# Patient Record
Sex: FEMALE | Race: WHITE | NOT HISPANIC OR LATINO | Employment: UNEMPLOYED | URBAN - METROPOLITAN AREA
[De-identification: names, ages, dates, MRNs, and addresses within clinical notes are randomized per-mention and may not be internally consistent; named-entity substitution may affect disease eponyms.]

---

## 2021-05-07 ENCOUNTER — HISTORICAL (OUTPATIENT)
Dept: RADIOLOGY | Facility: HOSPITAL | Age: 21
End: 2021-05-07

## 2021-06-22 ENCOUNTER — HISTORICAL (OUTPATIENT)
Dept: LAB | Facility: HOSPITAL | Age: 21
End: 2021-06-22

## 2021-06-22 LAB
ABS NEUT (OLG): 4.6 X10(3)/MCL (ref 1.5–6.9)
ALBUMIN SERPL-MCNC: 4.1 GM/DL (ref 3.5–5)
ALBUMIN/GLOB SERPL: 1.3 RATIO (ref 1.1–2)
ALP SERPL-CCNC: 68 UNIT/L (ref 40–150)
ALT SERPL-CCNC: 12 UNIT/L (ref 0–55)
AST SERPL-CCNC: 15 UNIT/L (ref 5–34)
BASOPHILS # BLD AUTO: 0 X10(3)/MCL (ref 0–0.1)
BASOPHILS NFR BLD AUTO: 0 % (ref 0–1)
BILIRUB SERPL-MCNC: 0.5 MG/DL
BILIRUBIN DIRECT+TOT PNL SERPL-MCNC: 0.2 MG/DL (ref 0–0.5)
BILIRUBIN DIRECT+TOT PNL SERPL-MCNC: 0.3 MG/DL (ref 0–0.8)
BUN SERPL-MCNC: 12 MG/DL (ref 7–18.7)
CALCIUM SERPL-MCNC: 9.5 MG/DL (ref 8.4–10.2)
CHLORIDE SERPL-SCNC: 108 MMOL/L (ref 98–107)
CO2 SERPL-SCNC: 26 MMOL/L (ref 22–29)
CREAT SERPL-MCNC: 0.8 MG/DL (ref 0.55–1.02)
CRP SERPL-MCNC: 0.19 MG/DL
EOSINOPHIL # BLD AUTO: 0.2 X10(3)/MCL (ref 0–0.6)
EOSINOPHIL NFR BLD AUTO: 2 % (ref 0–5)
ERYTHROCYTE [DISTWIDTH] IN BLOOD BY AUTOMATED COUNT: 13.7 % (ref 11.5–17)
ERYTHROCYTE [SEDIMENTATION RATE] IN BLOOD: 11 MM/HR (ref 0–20)
GLOBULIN SER-MCNC: 3.1 GM/DL (ref 2.4–3.5)
GLUCOSE SERPL-MCNC: 95 MG/DL (ref 74–100)
HCT VFR BLD AUTO: 37.8 % (ref 36–48)
HGB BLD-MCNC: 12.7 GM/DL (ref 12–16)
IMM GRANULOCYTES # BLD AUTO: 0.01 10*3/UL (ref 0–0.02)
IMM GRANULOCYTES NFR BLD AUTO: 0.2 % (ref 0–0.43)
LYMPHOCYTES # BLD AUTO: 1.3 X10(3)/MCL (ref 0.5–4.1)
LYMPHOCYTES NFR BLD AUTO: 20 % (ref 15–40)
MCH RBC QN AUTO: 30 PG (ref 27–34)
MCHC RBC AUTO-ENTMCNC: 34 GM/DL (ref 31–36)
MCV RBC AUTO: 88 FL (ref 80–99)
MONOCYTES # BLD AUTO: 0.6 X10(3)/MCL (ref 0–1.1)
MONOCYTES NFR BLD AUTO: 8 % (ref 4–12)
NEUTROPHILS # BLD AUTO: 4.6 X10(3)/MCL (ref 1.5–6.9)
NEUTROPHILS NFR BLD AUTO: 69 % (ref 43–75)
PLATELET # BLD AUTO: 187 X10(3)/MCL (ref 140–400)
PMV BLD AUTO: 12.8 FL (ref 6.8–10)
POTASSIUM SERPL-SCNC: 4.2 MMOL/L (ref 3.5–5.1)
PROT SERPL-MCNC: 7.2 GM/DL (ref 6.4–8.3)
RBC # BLD AUTO: 4.28 X10(6)/MCL (ref 4.2–5.4)
SODIUM SERPL-SCNC: 139 MMOL/L (ref 136–145)
WBC # SPEC AUTO: 6.6 X10(3)/MCL (ref 4.5–11.5)

## 2021-09-07 ENCOUNTER — HISTORICAL (OUTPATIENT)
Dept: LAB | Facility: HOSPITAL | Age: 21
End: 2021-09-07

## 2021-09-07 LAB — DEPRECATED CALCIDIOL+CALCIFEROL SERPL-MC: 96.7 NG/ML (ref 30–80)

## 2021-09-13 ENCOUNTER — HISTORICAL (OUTPATIENT)
Dept: RADIOLOGY | Facility: HOSPITAL | Age: 21
End: 2021-09-13

## 2021-11-08 ENCOUNTER — HISTORICAL (OUTPATIENT)
Dept: LAB | Facility: HOSPITAL | Age: 21
End: 2021-11-08

## 2021-11-08 LAB
ANTINUCLEAR ANTIBODY SCREEN (OHS): NEGATIVE
CENTROMERE PROTEIN ANTIBODY (OHS): NEGATIVE
DSDNA ANTIBODY (OHS): NEGATIVE
HCV AB SERPL QL IA: NONREACTIVE
JO-1 ANTIBODY (OHS): NEGATIVE
RHEUMATOID FACT SERPL-ACNC: <13 IU/ML
RNP70 ANTIBODY (OHS): NEGATIVE
SCLERODERMA (SCL-70S) ANTIBODY (OHS): NEGATIVE
SMITH DP IGG (OHS): NEGATIVE
SSA(RO) ANTIBODY (OHS): NEGATIVE
SSB(LA) ANTIBODY (OHS): NEGATIVE
U1RNP ANTIBODY (OHS): NEGATIVE

## 2022-12-20 ENCOUNTER — HOSPITAL ENCOUNTER (OUTPATIENT)
Dept: RADIOLOGY | Facility: HOSPITAL | Age: 22
Discharge: HOME | End: 2022-12-20
Payer: MEDICAID

## 2022-12-20 DIAGNOSIS — M79.604 RIGHT LEG PAIN: ICD-10-CM

## 2022-12-20 PROCEDURE — 73590 X-RAY EXAM OF LOWER LEG: CPT | Mod: TC,RT

## 2023-06-16 ENCOUNTER — OFFICE VISIT (OUTPATIENT)
Dept: OBSTETRICS AND GYNECOLOGY | Facility: CLINIC | Age: 23
End: 2023-06-16
Payer: MEDICAID

## 2023-06-16 VITALS
DIASTOLIC BLOOD PRESSURE: 74 MMHG | SYSTOLIC BLOOD PRESSURE: 120 MMHG | HEIGHT: 65 IN | WEIGHT: 150 LBS | BODY MASS INDEX: 24.99 KG/M2

## 2023-06-16 DIAGNOSIS — Z12.4 CERVICAL CANCER SCREENING: ICD-10-CM

## 2023-06-16 DIAGNOSIS — Z11.3 SCREENING FOR STDS (SEXUALLY TRANSMITTED DISEASES): ICD-10-CM

## 2023-06-16 DIAGNOSIS — Z30.9 ENCOUNTER FOR CONTRACEPTIVE MANAGEMENT, UNSPECIFIED TYPE: Primary | ICD-10-CM

## 2023-06-16 DIAGNOSIS — B00.1 FEVER BLISTER: ICD-10-CM

## 2023-06-16 DIAGNOSIS — Z30.018 ENCOUNTER FOR INITIAL PRESCRIPTION OF OTHER CONTRACEPTIVES: ICD-10-CM

## 2023-06-16 LAB
B-HCG UR QL: NEGATIVE
CTP QC/QA: YES

## 2023-06-16 PROCEDURE — 1160F RVW MEDS BY RX/DR IN RCRD: CPT | Mod: CPTII,,,

## 2023-06-16 PROCEDURE — 1160F PR REVIEW ALL MEDS BY PRESCRIBER/CLIN PHARMACIST DOCUMENTED: ICD-10-PCS | Mod: CPTII,,,

## 2023-06-16 PROCEDURE — 1159F MED LIST DOCD IN RCRD: CPT | Mod: CPTII,,,

## 2023-06-16 PROCEDURE — 3008F PR BODY MASS INDEX (BMI) DOCUMENTED: ICD-10-PCS | Mod: CPTII,,,

## 2023-06-16 PROCEDURE — 81025 POCT URINE PREGNANCY: ICD-10-PCS | Mod: ,,,

## 2023-06-16 PROCEDURE — 99395 PREV VISIT EST AGE 18-39: CPT | Mod: ,,,

## 2023-06-16 PROCEDURE — 3078F PR MOST RECENT DIASTOLIC BLOOD PRESSURE < 80 MM HG: ICD-10-PCS | Mod: CPTII,,,

## 2023-06-16 PROCEDURE — 99395 PR PREVENTIVE VISIT,EST,18-39: ICD-10-PCS | Mod: ,,,

## 2023-06-16 PROCEDURE — 1159F PR MEDICATION LIST DOCUMENTED IN MEDICAL RECORD: ICD-10-PCS | Mod: CPTII,,,

## 2023-06-16 PROCEDURE — 3008F BODY MASS INDEX DOCD: CPT | Mod: CPTII,,,

## 2023-06-16 PROCEDURE — 3074F PR MOST RECENT SYSTOLIC BLOOD PRESSURE < 130 MM HG: ICD-10-PCS | Mod: CPTII,,,

## 2023-06-16 PROCEDURE — 81025 URINE PREGNANCY TEST: CPT | Mod: ,,,

## 2023-06-16 PROCEDURE — 3074F SYST BP LT 130 MM HG: CPT | Mod: CPTII,,,

## 2023-06-16 PROCEDURE — 3078F DIAST BP <80 MM HG: CPT | Mod: CPTII,,,

## 2023-06-16 RX ORDER — VALACYCLOVIR HYDROCHLORIDE 1 G/1
1000 TABLET, FILM COATED ORAL 2 TIMES DAILY
Qty: 20 TABLET | Refills: 0 | Status: SHIPPED | OUTPATIENT
Start: 2023-06-16 | End: 2023-11-30 | Stop reason: ALTCHOICE

## 2023-06-19 NOTE — PROGRESS NOTES
Chief Complaint:  Well Woman (C/o yeast infection./Desires STD cultures. Not sexually active at this time. Desires to discuss non hormonal contraception. Cycles 28-35 patricio days. )    History of Present Illness:  Emily Avilez is a 23 y.o. year old  presents for her well woman. Currently using abstinence for birth control but would like to discuss non hormonal forms of contraception. Patient has monthly cycles with normal flow lasting 5 days. Denies any irregular menstrual bleeding. Pt desires STD testing d/t an increase in discharge. Pt also desires Valtrex for active fever blister.    LMP: 2023  Frequency: 28-35   Cycle Length: 5 days   Flow: normal  Intermenstrual Bleeding: No  Postcoital Bleeding: No  Dysmenorrhea: No  Sexually Active: not currently    Dyspareunia: No  Contraception: none    H/o STI: No   Last pap: over 1 year ago per pt  H/o Abnormal Pap: No   Colposcopy: No  Gardasil: 0   MMG: n/a  H/o abnl MMG: n/a         Review of Systems:  General/Constitutional: Chills denies. Fatigue/weakness denies. Fever denies. Night sweats denies. Hot flashes denies , +fever blister to right side of lower lip.  Respiratory: Cough denies. Hemoptysis denies. SOB denies. Sputum production denies. Wheezing denies .   Cardiovascular: Chest pain denies . Dizziness denies. Palpitations denies. Swelling in hands/feet denies    Gastrointestinal: Abdominal pain denies. Blood in stool denies. Constipation denies. Diarrhea denies. Heartburn denies. Nausea denies. Vomiting denies    Genitourinary: Incontinence denies. Blood in urine denies. Frequent urination denies. Painful urination denies. Urinary urgency denies. Nocturia denies    Gynecologic: Irregular menses denies. Heavy bleeding denies. Painful menses denies. Vaginal discharge denies. Vaginal odor denies. Vaginal itching denies. Vaginal lesion denies. Pelvic pain denies. Decreased libido denies. Vulvar lesion denies. Prolapse of genital organs denies. Painful  "intercourse denies. Postcoital bleeding denies    Psychiatric: Depression denies. Anxiety denies     OB History    Para Term  AB Living   1 1 1 0 0 1   SAB IAB Ectopic Multiple Live Births   0 0 0 0 1      # 1 - Date: , Sex: Female, Weight: 3.629 kg (8 lb), GA: None, Delivery: Vaginal, Spontaneous, Apgar1: None, Apgar5: None, Living: Living, Birth Comments: None      Past Medical History:   Diagnosis Date    Other lichen planus          Current Outpatient Medications:     valACYclovir (VALTREX) 1000 MG tablet, Take 1 tablet (1,000 mg total) by mouth 2 (two) times daily. for 10 days, Disp: 20 tablet, Rfl: 0    Review of patient's allergies indicates:  No Known Allergies    Past Surgical History:   Procedure Laterality Date    KNEE ARTHROSCOPY W/ ACL RECONSTRUCTION Right 2019    TONSILLECTOMY Bilateral        History reviewed. No pertinent family history.    Social History     Socioeconomic History    Marital status: Single   Tobacco Use    Smoking status: Never    Smokeless tobacco: Never   Substance and Sexual Activity    Alcohol use: Yes    Drug use: Never    Sexual activity: Yes     Partners: Male     Birth control/protection: None       Physical Exam:  /74 (BP Location: Right arm, Patient Position: Sitting)   Ht 5' 5" (1.651 m)   Wt 68 kg (150 lb)   LMP 2023   BMI 24.96 kg/m²     Chaperone: present.     General appearance: healthy, well-nourished and well-developed     Psychiatric: Orientation to time, place and person. Normal mood and affect and active, alert     Skin: Appearance: no rashes or lesions. +fever blister to right side of lower lip    Neck:   Neck: supple, FROM, trachea midline. and no masses   Thyroid: no enlargement or nodules and non-tender.       Cardiovascular:   Auscultation: RRR and no murmur.   Peripheral Vascular: no varicosities, LLE edema, RLE edema, calf tenderness, and palpable cord and pedal pulses intact.     Lungs:   Respiratory effort: no " intercostal retractions or accessory muscle usage.   Auscultation: no wheezing, rales/crackles, or rhonchi and clear to auscultation.     Breast:   Inspection/Palpation: no tenderness, discrete/distinct masses, skin changes, or abnormal secretions. Nipple appearance normal.     Abdomen:   Auscultation/Inspection/Palpation: no hepatomegaly, splenomegaly, masses, tenderness or CVA tenderness and soft, non-distended bowel sounds preset.    Hernia: no palpable hernias.     Female Genitalia:    Vulva: no masses, tenderness or lesions    Bladder/Urethra: no urethral discharge or mass, normal meatus, bladder non-distended.    Vagina: no tenderness, erythema, cystocele, rectocele, abnormal vaginal discharge or vesicle(s) or ulcers    Cervix: no discharge, no cervical lacerations noted or motion tenderness and grossly normal    Uterus: normal size and shape and midline, non-tender, and no uterine prolapse.    Adnexa/Parametria: no parametrial tenderness or mass, no adnexal tenderness or ovarian mass.     Lymph Nodes:   Palpation: non tender submandibular nodes, axillary nodes, or inguinal nodes.     Rectal Exam:   Rectum: normal perianal skin.       Assessment/Plan:  1. Encounter for contraceptive management, unspecified type  Pap  Advised patient if she notices any changes to her breasts, including a lump, mass, dimpling, discharge, rash or tenderness, to should contact us immediately   Healthy diet, exercise   Multivitamin   Seat belt   Sunscreen use   Safe sex/ STI education   Contraception evaluation: Desires Paraguard   Gardasil evaluation: 0/3      2. Cervical cancer screening  -     Liquid-Based Pap Smear, Screening Screening    3. Screening for STDs (sexually transmitted diseases)  -     Leuk panel on pap    4. Fever blister  -     valACYclovir (VALTREX) 1000 MG tablet; Take 1 tablet (1,000 mg total) by mouth 2 (two) times daily. for 10 days  Dispense: 20 tablet; Refill: 0    5. Encounter for initial prescription of  other contraceptives  Discussed with patient contraceptive options including natural family planning, barrier, oral contraceptives, patch, NuvaRing, Depo-Provera, Nexplanon, IUDs, abstinence.       Safe sex education       Discussed with patient that birth control options discussed above do not protect against STDs.     Patient desires the Paraguard IUD as a non hormonal option    Pt will RTC for insertion of Paraguard with Dr. Riggs or Mariama Amaya, MARNI

## 2023-06-20 LAB — PSYCHE PATHOLOGY RESULT: NORMAL

## 2023-06-21 ENCOUNTER — TELEPHONE (OUTPATIENT)
Dept: OBSTETRICS AND GYNECOLOGY | Facility: CLINIC | Age: 23
End: 2023-06-21
Payer: MEDICAID

## 2023-06-21 NOTE — TELEPHONE ENCOUNTER
----- Message from Ramandeep Flores sent at 6/21/2023  3:22 PM CDT -----  Regarding: Nurse call  .Type:  Test Results    Who Called:  patient  Best Call Back Number:  [phone]  Additional Information:   called regarding seeing results on kiana- all shows negative except she thinks she saw something about a yeast infection; is already on meds for something else- should she keep taking the antibiotics or wait until her results are explained and different meds are called out

## 2023-07-03 ENCOUNTER — DOCUMENT SCAN (OUTPATIENT)
Dept: OBSTETRICS AND GYNECOLOGY | Facility: CLINIC | Age: 23
End: 2023-07-03
Payer: MEDICAID

## 2023-07-19 ENCOUNTER — PROCEDURE VISIT (OUTPATIENT)
Dept: OBSTETRICS AND GYNECOLOGY | Facility: CLINIC | Age: 23
End: 2023-07-19
Payer: MEDICAID

## 2023-07-19 VITALS
SYSTOLIC BLOOD PRESSURE: 108 MMHG | BODY MASS INDEX: 24.79 KG/M2 | DIASTOLIC BLOOD PRESSURE: 68 MMHG | WEIGHT: 149 LBS | HEART RATE: 72 BPM

## 2023-07-19 DIAGNOSIS — Z30.430 ENCOUNTER FOR IUD INSERTION: Primary | ICD-10-CM

## 2023-07-19 LAB
B-HCG UR QL: NEGATIVE
CTP QC/QA: YES

## 2023-07-19 PROCEDURE — 58300 INSERT INTRAUTERINE DEVICE: CPT | Mod: ,,, | Performed by: OBSTETRICS & GYNECOLOGY

## 2023-07-19 PROCEDURE — 81025 POCT URINE PREGNANCY: ICD-10-PCS | Mod: ,,, | Performed by: OBSTETRICS & GYNECOLOGY

## 2023-07-19 PROCEDURE — 99499 UNLISTED E&M SERVICE: CPT | Mod: ,,, | Performed by: OBSTETRICS & GYNECOLOGY

## 2023-07-19 PROCEDURE — 99499 NO LOS: ICD-10-PCS | Mod: ,,, | Performed by: OBSTETRICS & GYNECOLOGY

## 2023-07-19 PROCEDURE — 81025 URINE PREGNANCY TEST: CPT | Mod: ,,, | Performed by: OBSTETRICS & GYNECOLOGY

## 2023-07-19 PROCEDURE — 58300 PR INSERT INTRAUTERINE DEVICE: ICD-10-PCS | Mod: ,,, | Performed by: OBSTETRICS & GYNECOLOGY

## 2023-07-19 NOTE — PROGRESS NOTES
Chief Complaint:  IUD insert ( Paraguard insert)    History of Present Illness:   Emily Avilez is a 23 y.o.  who presents today for Paragard IUD placement. Consent given.       LMP: 23  Frequency: 28-35   Cycle Length: 5 days   Flow: normal  Intermenstrual Bleeding: No  Postcoital Bleeding: No  Dysmenorrhea: No  Sexually Active: not currently    Dyspareunia: No  Contraception: none  H/o STI: No   Last pap: over 1 year ago per pt  H/o Abnormal Pap: No   Colposcopy: No  Gardasil: 0   MMG: n/a  H/o abnl MMG: n/a      Review of Systems:  General/Constitutional: Chills denies. Fatigue/weakness denies. Fever denies . Night sweats denies . Hot flashes denies  Gynecologic: Irregular menses denies.  Heavy bleeding  denies.  Painful menses denies.  Vaginal discharge denies. Vaginal odor denies. Vaginal itching/Irritation denies. Vaginal lesion denies.  Pelvic pain denies. Decreased libido denies. Vulvar lesion denies. Prolapse of genital organs denies. Painful intercourse denies. Postcoital bleeding denies   Psychiatric: Mood lability denies.  Depressed mood denies. Suicidal thoughts denies. Anxiety denies.  Overwhelmed denies.  Appetite normal. Energy level normal      OB History    Para Term  AB Living   1 1 1 0 0 1   SAB IAB Ectopic Multiple Live Births   0 0 0 0 1      # 1 - Date: , Sex: Female, Weight: 3.629 kg (8 lb), GA: None, Delivery: Vaginal, Spontaneous, Apgar1: None, Apgar5: None, Living: Living, Birth Comments: None      Current Outpatient Medications:     valACYclovir (VALTREX) 1000 MG tablet, Take 1 tablet (1,000 mg total) by mouth 2 (two) times daily. for 10 days, Disp: 20 tablet, Rfl: 0  No current facility-administered medications for this visit.    Physical Exam:  /68 (BP Location: Right arm, Patient Position: Sitting)   Pulse 72   Wt 67.6 kg (149 lb)   LMP 2023 (Exact Date)   BMI 24.79 kg/m²     Chaperone present.    Constitutional: General appearance: healthy,  well-nourished and well-developed  Psychiatric: Orientation to time, place and person. Normal mood and affect and active, alert  Abdomen: Auscultation/Inspection/Palpation: deferred  Female Genitalia:  Vulva: no masses, atrophy or lesions  Bladder/Urethra: no urethral discharge or mass, normal meatus, bladder   non-distended.  Vagina: no tenderness, erythema, cystocele, rectocele, abnormal  vaginal discharge, or vesicle(s) or ulcers   Cervix: no discharge or cervical motion tenderness and grossly normal  Uterus: normal size and shape and midline, non-tender, and no uterine   prolapse.  Adnexa/Parametria: no parametrial tenderness or mass, no adnexal tenderness  or ovarian mass.    Procedure:   After having reviewed the contraindications, side effects, and risks and benefits of all major options for reversible contraception, the patient chose the paragard IUD for contraception. We discussed the risks of insertion, pelvic infection, and the possibility of failure. Patient stated that she has a good understanding of all we discussed, and all questions were answered regarding IUD use. Patient signed consent form.   Pelvic examination was normal. Pap smear is current. Urine pregnancy test negative.  With the patient in the dorsal lithotomy position, a speculum was placed in the vagina. The cervix and vagina were prepped with Betadine, the cervix was stabilized with a tenaculum. The paragard IUD was then placed in the endometrial cavity as directed without complications. The strings were trimmed to approximately to 2 cm.  Patient tolerated procedure well.      Assessment/Plan:  1. Encounter for IUD insertion  Explained to patient, and options for contraceptive including natural family planning, barrier methods, oral contraceptives, patch, NuvaRing, Depo-Provera, Nexplanon, IUD, and sterilization     Patient desires IUD insertion.    Risk factors reviewed and not present.    HCG negative.     Tolerated well.        Discussed cramping and bleeding for 6-8 weeks.  If breakthrough bleeding continues after 3 months add back or change may be necessary.       A second form of birth control should be used for the first 10 days after insertion.       Advised patient that smoking is harmful due to increased risk of stroke, heart attack, and blood clots when taking contraceptive hormones. Patient is to contact us immediately if she experiences severe abdominal pain, severe chest pain, severe headaches, eye visual changes, severe leg pain, or shortness of breath       Call with fever, foul discharge, or intense abdominal pain. Ibuprofen for cramping.       Return to clinic 1 month for string check.     Reminded IUD no longer effective after     Consent signed and time out performed

## 2023-08-09 NOTE — PROGRESS NOTES
"  Chief Complaint     iud follow up    HPI:     Patient is a 23 y.o.  presents today for IUD string check. Paragard IUD inserted on 23     LMP: 23  Frequency: 28-35   Cycle Length: 5 days   Flow: normal  Intermenstrual Bleeding: No  Postcoital Bleeding: No  Dysmenorrhea: No  Sexually Active: not currently    Dyspareunia: No  Contraception: none  H/o STI: No   Last pap: over 1 year ago per pt  H/o Abnormal Pap: No   Colposcopy: No      Past Medical History:   Diagnosis Date    Other lichen planus        Past Surgical History:   Procedure Laterality Date    INTRAUTERINE DEVICE INSERTION  2023    Paragard by Dr KAREN Riggs    KNEE ARTHROSCOPY W/ ACL RECONSTRUCTION Right 2019    TONSILLECTOMY Bilateral        No family history on file.    OB History          1    Para   1    Term   1            AB        Living   1         SAB        IAB        Ectopic        Multiple        Live Births   1                 Current Outpatient Medications on File Prior to Visit   Medication Sig Dispense Refill    valACYclovir (VALTREX) 1000 MG tablet Take 1 tablet (1,000 mg total) by mouth 2 (two) times daily. for 10 days 20 tablet 0     No current facility-administered medications on file prior to visit.       Review of Systems:       Review of Systems   Constitutional:  Negative for chills and fever.   Gastrointestinal:  Negative for abdominal pain, constipation and diarrhea.   Genitourinary:  Negative for bladder incontinence, decreased libido, dysmenorrhea, dyspareunia, dysuria, flank pain, frequency, genital sores, hematuria, hot flashes, menorrhagia, menstrual problem, pelvic pain, urgency, vaginal bleeding, vaginal discharge, vaginal pain, urinary incontinence, postcoital bleeding, postmenopausal bleeding, vaginal dryness and vaginal odor.        Physical Exam:    BP 98/68   Ht 5' 5" (1.651 m)   Wt 69.2 kg (152 lb 8.9 oz)   LMP 2023 (Exact Date)   BMI 25.39 kg/m²     Physical Exam "     General Exam:    General Appearance: alert, in no acute distress, normal, well nourished.  Pelvis:   - Vulva: Normal   - Perianal skin: Normal   - Urethra: Normal meatus. Q-tip: Not performed   - Vagina: NormalVaginal discharge present: . Cystocele: Absent. Rectocele: Absent   - Cervix: Normal. Cervical motion tenderness Absent   - Uterus: Size - Normal. Mobile. Non-tender. IUD strings in place   - Adnexal: Normal    Assessment:   1. IUD check up         Plan:       Strings in place     RTC PRN/Annual

## 2023-08-15 ENCOUNTER — OFFICE VISIT (OUTPATIENT)
Dept: OBSTETRICS AND GYNECOLOGY | Facility: CLINIC | Age: 23
End: 2023-08-15
Payer: MEDICAID

## 2023-08-15 VITALS
BODY MASS INDEX: 25.42 KG/M2 | WEIGHT: 152.56 LBS | SYSTOLIC BLOOD PRESSURE: 98 MMHG | DIASTOLIC BLOOD PRESSURE: 68 MMHG | HEIGHT: 65 IN

## 2023-08-15 DIAGNOSIS — Z30.431 IUD CHECK UP: Primary | ICD-10-CM

## 2023-08-15 PROCEDURE — 3074F PR MOST RECENT SYSTOLIC BLOOD PRESSURE < 130 MM HG: ICD-10-PCS | Mod: CPTII,,, | Performed by: OBSTETRICS & GYNECOLOGY

## 2023-08-15 PROCEDURE — 3074F SYST BP LT 130 MM HG: CPT | Mod: CPTII,,, | Performed by: OBSTETRICS & GYNECOLOGY

## 2023-08-15 PROCEDURE — 99213 OFFICE O/P EST LOW 20 MIN: CPT | Mod: ,,, | Performed by: OBSTETRICS & GYNECOLOGY

## 2023-08-15 PROCEDURE — 3078F PR MOST RECENT DIASTOLIC BLOOD PRESSURE < 80 MM HG: ICD-10-PCS | Mod: CPTII,,, | Performed by: OBSTETRICS & GYNECOLOGY

## 2023-08-15 PROCEDURE — 1159F PR MEDICATION LIST DOCUMENTED IN MEDICAL RECORD: ICD-10-PCS | Mod: CPTII,,, | Performed by: OBSTETRICS & GYNECOLOGY

## 2023-08-15 PROCEDURE — 3008F BODY MASS INDEX DOCD: CPT | Mod: CPTII,,, | Performed by: OBSTETRICS & GYNECOLOGY

## 2023-08-15 PROCEDURE — 3078F DIAST BP <80 MM HG: CPT | Mod: CPTII,,, | Performed by: OBSTETRICS & GYNECOLOGY

## 2023-08-15 PROCEDURE — 1159F MED LIST DOCD IN RCRD: CPT | Mod: CPTII,,, | Performed by: OBSTETRICS & GYNECOLOGY

## 2023-08-15 PROCEDURE — 3008F PR BODY MASS INDEX (BMI) DOCUMENTED: ICD-10-PCS | Mod: CPTII,,, | Performed by: OBSTETRICS & GYNECOLOGY

## 2023-08-15 PROCEDURE — 99213 PR OFFICE/OUTPT VISIT, EST, LEVL III, 20-29 MIN: ICD-10-PCS | Mod: ,,, | Performed by: OBSTETRICS & GYNECOLOGY

## 2023-11-30 ENCOUNTER — OFFICE VISIT (OUTPATIENT)
Dept: OBSTETRICS AND GYNECOLOGY | Facility: CLINIC | Age: 23
End: 2023-11-30
Payer: MEDICAID

## 2023-11-30 VITALS
SYSTOLIC BLOOD PRESSURE: 104 MMHG | BODY MASS INDEX: 25.13 KG/M2 | HEIGHT: 65 IN | DIASTOLIC BLOOD PRESSURE: 60 MMHG | WEIGHT: 150.81 LBS

## 2023-11-30 DIAGNOSIS — N76.0 BACTERIAL VAGINOSIS: Primary | ICD-10-CM

## 2023-11-30 DIAGNOSIS — B96.89 BACTERIAL VAGINOSIS: Primary | ICD-10-CM

## 2023-11-30 DIAGNOSIS — Z11.3 SCREENING EXAMINATION FOR VENEREAL DISEASE: ICD-10-CM

## 2023-11-30 DIAGNOSIS — N89.8 VAGINAL ODOR: ICD-10-CM

## 2023-11-30 LAB
BACTERIA HYPHAE, POC: NEGATIVE
GARDNERELLA VAGINALIS: NEGATIVE
OTHER MICROSC. OBSERVATIONS: ABNORMAL
POC BACTERIAL VAGINOSIS: POSITIVE
POC CLUE CELLS: POSITIVE
TRICHOMONAS, POC: NEGATIVE
YEAST WET PREP: NEGATIVE
YEAST, POC: NEGATIVE

## 2023-11-30 PROCEDURE — 87220 POCT KOH: ICD-10-PCS | Mod: ,,,

## 2023-11-30 PROCEDURE — 87220 TISSUE EXAM FOR FUNGI: CPT | Mod: ,,,

## 2023-11-30 PROCEDURE — 3078F DIAST BP <80 MM HG: CPT | Mod: CPTII,,,

## 2023-11-30 PROCEDURE — 99213 OFFICE O/P EST LOW 20 MIN: CPT | Mod: ,,,

## 2023-11-30 PROCEDURE — 3008F BODY MASS INDEX DOCD: CPT | Mod: CPTII,,,

## 2023-11-30 PROCEDURE — 1160F RVW MEDS BY RX/DR IN RCRD: CPT | Mod: CPTII,,,

## 2023-11-30 PROCEDURE — 3074F PR MOST RECENT SYSTOLIC BLOOD PRESSURE < 130 MM HG: ICD-10-PCS | Mod: CPTII,,,

## 2023-11-30 PROCEDURE — 3074F SYST BP LT 130 MM HG: CPT | Mod: CPTII,,,

## 2023-11-30 PROCEDURE — 3008F PR BODY MASS INDEX (BMI) DOCUMENTED: ICD-10-PCS | Mod: CPTII,,,

## 2023-11-30 PROCEDURE — 1160F PR REVIEW ALL MEDS BY PRESCRIBER/CLIN PHARMACIST DOCUMENTED: ICD-10-PCS | Mod: CPTII,,,

## 2023-11-30 PROCEDURE — 99213 PR OFFICE/OUTPT VISIT, EST, LEVL III, 20-29 MIN: ICD-10-PCS | Mod: ,,,

## 2023-11-30 PROCEDURE — 87210 POCT WET PREP: ICD-10-PCS | Mod: QW,,,

## 2023-11-30 PROCEDURE — 87210 SMEAR WET MOUNT SALINE/INK: CPT | Mod: QW,,,

## 2023-11-30 PROCEDURE — 3078F PR MOST RECENT DIASTOLIC BLOOD PRESSURE < 80 MM HG: ICD-10-PCS | Mod: CPTII,,,

## 2023-11-30 PROCEDURE — 1159F MED LIST DOCD IN RCRD: CPT | Mod: CPTII,,,

## 2023-11-30 PROCEDURE — 1159F PR MEDICATION LIST DOCUMENTED IN MEDICAL RECORD: ICD-10-PCS | Mod: CPTII,,,

## 2023-11-30 RX ORDER — METRONIDAZOLE 500 MG/1
500 TABLET ORAL EVERY 12 HOURS
Qty: 14 TABLET | Refills: 0 | Status: SHIPPED | OUTPATIENT
Start: 2023-11-30 | End: 2023-12-07

## 2023-11-30 NOTE — PROGRESS NOTES
"  Subjective:      Patient ID: Emily Avilez is a 23 y.o. female.    Chief Complaint:  STD CHECK (Requesting STD screening) and vaginal bump (C/o "Bump in my vagina about 2 days ago.")      History of Present Illness  HPI  Emily Avilez 23 y.o. White female  presents to clinic with c/o a possible non tender vaginal bump that she noticed 2 days ago to her left vaginal wall near the introitus. She would also like STD testing as she had unprotected intercourse recently. Pt is also c/o of a mild vaginal odor. Pt has paraguard IUD.       GYN & OB History  No LMP recorded (lmp unknown).   Date of Last Pap: 2023    OB History    Para Term  AB Living   1 1 1     1   SAB IAB Ectopic Multiple Live Births           1      # Outcome Date GA Lbr James/2nd Weight Sex Delivery Anes PTL Lv   1 Term    3.629 kg (8 lb) F Vag-Spont   MAGGI       Review of Systems  Review of Systems   Constitutional: Negative.    HENT: Negative.     Eyes: Negative.    Respiratory: Negative.     Cardiovascular: Negative.    Gastrointestinal: Negative.    Endocrine: Negative.    Genitourinary:  Positive for genital sores (vaginal bump) and vaginal odor.   Musculoskeletal: Negative.    Integumentary:  Negative.   Neurological: Negative.    Hematological: Negative.    Psychiatric/Behavioral: Negative.     Breast: negative.           Objective:     /60 (BP Location: Left arm, Patient Position: Sitting)   Ht 5' 5" (1.651 m)   Wt 68.4 kg (150 lb 12.8 oz)   LMP  (LMP Unknown) Comment: "Monthly around the first of the month."  BMI 25.09 kg/m²     Physical Exam:   Constitutional: She is oriented to person, place, and time. She appears well-developed and well-nourished.    HENT:   Head: Normocephalic.    Eyes: Pupils are equal, round, and reactive to light. EOM are normal.     Cardiovascular:  Normal rate.             Pulmonary/Chest: Effort normal.        Abdominal: Soft.     Genitourinary:    Inguinal canal, urethra, bladder, " right adnexa and rectum normal.      Pelvic exam was performed with patient in the lithotomy position.   The external female genitalia was normal.   Labial bartholins normal.Cervix is normal. There is bleeding in the vagina. Vagina was moist.Normal urethral meatus.IUD strings visualized.          Musculoskeletal: Normal range of motion.       Neurological: She is alert and oriented to person, place, and time.    Skin: Skin is warm and dry.    Psychiatric: She has a normal mood and affect.   -no vaginal bump or lesion noted on exam  -Wet prep: +whiff and clue     Assessment:     1. Bacterial vaginosis    2. Screening examination for venereal disease    3. Vaginal odor             Plan:   Educated on BV  Wet prep: clue + whiff   Flagyl 500mg BID x7 days  No douching   Call office if no improvement in 72 hours     One swab with leuk, myco, urea, AV, and BV panels    RTC pending results or PRN

## 2023-12-06 ENCOUNTER — TELEPHONE (OUTPATIENT)
Dept: OBSTETRICS AND GYNECOLOGY | Facility: CLINIC | Age: 23
End: 2023-12-06
Payer: MEDICAID

## 2023-12-06 DIAGNOSIS — B37.31 VAGINAL YEAST INFECTION: ICD-10-CM

## 2023-12-06 DIAGNOSIS — A49.1 GBS (GROUP B STREPTOCOCCUS) INFECTION: Primary | ICD-10-CM

## 2023-12-06 NOTE — TELEPHONE ENCOUNTER
----- Message from Ramandeep Flores sent at 12/6/2023  1:56 PM CST -----  Regarding: Test results  .Type:  Test Results    Who Called:  patient  Ordering Provider:  Madeline Faustin Call Back Number:  [phone]  Additional Information:   saw results on kiana- has questions

## 2023-12-07 RX ORDER — FLUCONAZOLE 200 MG/1
200 TABLET ORAL
Qty: 4 TABLET | Refills: 0 | Status: SHIPPED | OUTPATIENT
Start: 2023-12-07 | End: 2023-12-14

## 2023-12-07 RX ORDER — AMPICILLIN 500 MG/1
500 CAPSULE ORAL 4 TIMES DAILY
Qty: 28 CAPSULE | Refills: 0 | Status: SHIPPED | OUTPATIENT
Start: 2023-12-07 | End: 2023-12-14

## 2023-12-07 NOTE — TELEPHONE ENCOUNTER
----- Message from LARRY Bowman sent at 12/7/2023  9:01 AM CST -----  Please notify pt of +BV, yeast, and GBS on one swab. She was already treated for the BV. She will need Diflucan 200mg every other day for 4 doses for the yeast and Ampicillin QID x7 days for the GBS. Thanks!

## 2024-02-05 ENCOUNTER — OFFICE VISIT (OUTPATIENT)
Dept: OBSTETRICS AND GYNECOLOGY | Facility: CLINIC | Age: 24
End: 2024-02-05
Payer: MEDICAID

## 2024-02-05 ENCOUNTER — LAB VISIT (OUTPATIENT)
Dept: LAB | Facility: HOSPITAL | Age: 24
End: 2024-02-05
Payer: MEDICAID

## 2024-02-05 VITALS
DIASTOLIC BLOOD PRESSURE: 70 MMHG | SYSTOLIC BLOOD PRESSURE: 112 MMHG | WEIGHT: 147.5 LBS | BODY MASS INDEX: 24.54 KG/M2 | TEMPERATURE: 98 F

## 2024-02-05 DIAGNOSIS — N90.7 INCLUSION CYST OF VULVA: ICD-10-CM

## 2024-02-05 DIAGNOSIS — N89.8 VAGINAL LESION: ICD-10-CM

## 2024-02-05 DIAGNOSIS — Z11.3 SCREENING EXAMINATION FOR STD (SEXUALLY TRANSMITTED DISEASE): ICD-10-CM

## 2024-02-05 DIAGNOSIS — Z11.3 SCREENING EXAMINATION FOR STD (SEXUALLY TRANSMITTED DISEASE): Primary | ICD-10-CM

## 2024-02-05 LAB
HAV IGM SERPL QL IA: NONREACTIVE
HBV CORE IGM SERPL QL IA: NONREACTIVE
HBV SURFACE AG SERPL QL IA: NONREACTIVE
HCV AB SERPL QL IA: NONREACTIVE
HIV 1+2 AB+HIV1 P24 AG SERPL QL IA: NONREACTIVE

## 2024-02-05 PROCEDURE — 3008F BODY MASS INDEX DOCD: CPT | Mod: CPTII,,,

## 2024-02-05 PROCEDURE — 99213 OFFICE O/P EST LOW 20 MIN: CPT | Mod: ,,,

## 2024-02-05 PROCEDURE — 86780 TREPONEMA PALLIDUM: CPT

## 2024-02-05 PROCEDURE — 80074 ACUTE HEPATITIS PANEL: CPT

## 2024-02-05 PROCEDURE — 86696 HERPES SIMPLEX TYPE 2 TEST: CPT

## 2024-02-05 PROCEDURE — 1159F MED LIST DOCD IN RCRD: CPT | Mod: CPTII,,,

## 2024-02-05 PROCEDURE — 3078F DIAST BP <80 MM HG: CPT | Mod: CPTII,,,

## 2024-02-05 PROCEDURE — 1160F RVW MEDS BY RX/DR IN RCRD: CPT | Mod: CPTII,,,

## 2024-02-05 PROCEDURE — 87389 HIV-1 AG W/HIV-1&-2 AB AG IA: CPT

## 2024-02-05 PROCEDURE — 3074F SYST BP LT 130 MM HG: CPT | Mod: CPTII,,,

## 2024-02-05 PROCEDURE — 36415 COLL VENOUS BLD VENIPUNCTURE: CPT

## 2024-02-05 NOTE — PROGRESS NOTES
"  Subjective:      Patient ID: Emily Avilez is a 23 y.o. female.    Chief Complaint:  STD CHECK (Pt desires STD check. Pt states that she feels something down there)      History of Present Illness  HPI  Emily Avilez 23 y.o. White female  presents to clinic with c/o a possible vulvar lesion to her left labia minora. States she noticed it last night. Denies any pain to area. States "I just feel something there and I'm worried it may be herpes. Denies any discharge, odor, or pain. Pt desires full STD screening including HSV.    GYN & OB History  Patient's last menstrual period was 01/10/2024 (exact date).   Date of Last Pap: 2023    OB History    Para Term  AB Living   1 1 1     1   SAB IAB Ectopic Multiple Live Births           1      # Outcome Date GA Lbr James/2nd Weight Sex Delivery Anes PTL Lv   1 Term    3.629 kg (8 lb) F Vag-Spont   MAGGI       Review of Systems  Review of Systems   Constitutional: Negative.    HENT: Negative.     Eyes: Negative.    Respiratory: Negative.     Cardiovascular: Negative.    Gastrointestinal: Negative.    Endocrine: Negative.    Genitourinary:  Positive for genital sores.   Musculoskeletal: Negative.    Integumentary:  Negative.   Neurological: Negative.    Hematological: Negative.    Psychiatric/Behavioral: Negative.     Breast: negative.           Objective:     /70 (BP Location: Right arm, Patient Position: Sitting, BP Method: Medium (Manual))   Temp 98.1 °F (36.7 °C) (Temporal)   Wt 66.9 kg (147 lb 7.8 oz)   LMP 01/10/2024 (Exact Date)   BMI 24.54 kg/m²     Physical Exam:   Constitutional: She is oriented to person, place, and time. She appears well-developed and well-nourished.    HENT:   Head: Normocephalic.    Eyes: Pupils are equal, round, and reactive to light. EOM are normal.     Cardiovascular:  Normal rate.             Pulmonary/Chest: Effort normal.        Abdominal: Soft.     Genitourinary:    Inguinal canal and rectum normal. "            Pelvic exam was performed with patient in the lithotomy position.   The external female genitalia was normal.     Labial bartholins normal.   Genitourinary Comments: Inclusion cyst noted to left labia minora to area above. Non tender.              Musculoskeletal: Normal range of motion.       Neurological: She is alert and oriented to person, place, and time.    Skin: Skin is warm and dry.    Psychiatric: She has a normal mood and affect.         Assessment:     1. Screening examination for STD (sexually transmitted disease)    2. Vaginal lesion    3. Inclusion cyst of vulva            Plan:   Discussed the various types of STDs, related symptoms and the potential consequences (including effects on fertility) of STD infections.       Reviewed ways to limit exposure and prevention techniques.       Cultures: gc/cz/tv    Labs: HIV/RPR/Hep Panel/HSV panel  One swab for HSV to area of concern

## 2024-02-06 LAB
HSV1 IGG SERPL QL IA: POSITIVE
HSV2 IGG SERPL QL IA: NEGATIVE
T PALLIDUM AB SER QL: NONREACTIVE

## 2024-07-22 ENCOUNTER — OFFICE VISIT (OUTPATIENT)
Dept: OBSTETRICS AND GYNECOLOGY | Facility: CLINIC | Age: 24
End: 2024-07-22
Payer: MEDICAID

## 2024-07-22 VITALS
TEMPERATURE: 98 F | HEIGHT: 65 IN | DIASTOLIC BLOOD PRESSURE: 66 MMHG | WEIGHT: 145.81 LBS | BODY MASS INDEX: 24.29 KG/M2 | SYSTOLIC BLOOD PRESSURE: 118 MMHG

## 2024-07-22 DIAGNOSIS — Z97.5 IUD (INTRAUTERINE DEVICE) IN PLACE: ICD-10-CM

## 2024-07-22 DIAGNOSIS — N92.6 IRREGULAR BLEEDING: Primary | ICD-10-CM

## 2024-07-22 LAB
B-HCG UR QL: NEGATIVE
CTP QC/QA: YES

## 2024-07-22 PROCEDURE — 3008F BODY MASS INDEX DOCD: CPT | Mod: CPTII,,,

## 2024-07-22 PROCEDURE — 1159F MED LIST DOCD IN RCRD: CPT | Mod: CPTII,,,

## 2024-07-22 PROCEDURE — 1160F RVW MEDS BY RX/DR IN RCRD: CPT | Mod: CPTII,,,

## 2024-07-22 PROCEDURE — 81025 URINE PREGNANCY TEST: CPT | Mod: ,,,

## 2024-07-22 PROCEDURE — 3074F SYST BP LT 130 MM HG: CPT | Mod: CPTII,,,

## 2024-07-22 PROCEDURE — 3078F DIAST BP <80 MM HG: CPT | Mod: CPTII,,,

## 2024-07-22 PROCEDURE — 99213 OFFICE O/P EST LOW 20 MIN: CPT | Mod: ,,,

## 2024-07-22 NOTE — PROGRESS NOTES
"Subjective     Patient ID: Emily Avilez is a 24 y.o. female.    Chief Complaint:  Irregular Bleeding with IUD  (C/o irregular bleeding with IUD Sts "my cycle never starts then stop, then starts again" Paragard inserted 23 Denies pain/bleeding with intercourse)      History of Present Illness  HPI  Emily Avilez 24 y.o. White female  presents to clinic with c/o of an early irregular cycle with Paragard IUD. Pt states her cycles have been heavy but cyclic since insertion in 2023. States this cycle came 10 days early then stopped and restarted. Currently bleeding. UPT negative.       GYN & OB History  Patient's last menstrual period was 2024.   Date of Last Pap: 2023    OB History    Para Term  AB Living   1 1 1     1   SAB IAB Ectopic Multiple Live Births           1      # Outcome Date GA Lbr James/2nd Weight Sex Type Anes PTL Lv   1 Term    3.629 kg (8 lb) F Vag-Spont   MAGGI       Review of Systems  Review of Systems   Constitutional: Negative.    HENT: Negative.     Eyes: Negative.    Respiratory: Negative.     Cardiovascular: Negative.    Gastrointestinal: Negative.    Endocrine: Negative.    Genitourinary:  Positive for menstrual problem and vaginal bleeding.   Musculoskeletal: Negative.    Integumentary:  Negative.   Neurological: Negative.    Hematological: Negative.    Psychiatric/Behavioral: Negative.     Breast: negative.           Objective   /66   Temp 97.9 °F (36.6 °C)   Ht 5' 5" (1.651 m)   Wt 66.1 kg (145 lb 12.8 oz)   LMP 2024   BMI 24.26 kg/m²     Physical Exam:   Constitutional: She is oriented to person, place, and time. She appears well-developed and well-nourished.    HENT:   Head: Normocephalic.    Eyes: Pupils are equal, round, and reactive to light. EOM are normal.     Cardiovascular:  Normal rate.             Pulmonary/Chest: Effort normal.        Abdominal: Soft.     Genitourinary:    Inguinal canal, urethra and rectum normal.      " Pelvic exam was performed with patient in the lithotomy position.   The external female genitalia was normal.     Labial bartholins normal.There is bleeding in the vagina. Vagina was moist.Normal urethral meatus.IUD strings visualized.          Musculoskeletal: Normal range of motion.       Neurological: She is alert and oriented to person, place, and time.    Skin: Skin is warm and dry.    Psychiatric: She has a normal mood and affect.            Assessment and Plan     1. Irregular bleeding    2. IUD (intrauterine device) in place          Plan:  UPT negative  One swab with leuk, myco, urea  IUD strings in place.    F/u pending results and for annual.

## 2024-07-30 ENCOUNTER — TELEPHONE (OUTPATIENT)
Dept: OBSTETRICS AND GYNECOLOGY | Facility: CLINIC | Age: 24
End: 2024-07-30
Payer: MEDICAID

## 2024-07-30 DIAGNOSIS — A49.3 MYCOPLASMA INFECTION: Primary | ICD-10-CM

## 2024-07-30 RX ORDER — DOXYCYCLINE 100 MG/1
100 CAPSULE ORAL 2 TIMES DAILY
Qty: 14 CAPSULE | Refills: 0 | Status: SHIPPED | OUTPATIENT
Start: 2024-07-30 | End: 2024-08-06

## 2024-07-30 RX ORDER — AZITHROMYCIN 500 MG/1
TABLET, FILM COATED ORAL
Qty: 3 TABLET | Refills: 0 | Status: SHIPPED | OUTPATIENT
Start: 2024-07-30

## 2024-07-30 NOTE — TELEPHONE ENCOUNTER
----- Message from LARRY Bowman sent at 7/30/2024  4:10 PM CDT -----  Please notify pt of +MG on one swab and treat with Doxy 100mg BID x7 days followed by Azithromycin 1gm for initial dose then 500mg daily for 3 days. Please offer partner treatment.     Pt will need a GEMA in 4 weeks. Thanks!

## 2024-08-01 ENCOUNTER — TELEPHONE (OUTPATIENT)
Dept: OBSTETRICS AND GYNECOLOGY | Facility: CLINIC | Age: 24
End: 2024-08-01

## 2024-08-01 ENCOUNTER — OFFICE VISIT (OUTPATIENT)
Dept: OBSTETRICS AND GYNECOLOGY | Facility: CLINIC | Age: 24
End: 2024-08-01
Payer: MEDICAID

## 2024-08-01 ENCOUNTER — PATIENT MESSAGE (OUTPATIENT)
Dept: OBSTETRICS AND GYNECOLOGY | Facility: CLINIC | Age: 24
End: 2024-08-01

## 2024-08-01 VITALS
HEIGHT: 65 IN | DIASTOLIC BLOOD PRESSURE: 70 MMHG | BODY MASS INDEX: 23.79 KG/M2 | WEIGHT: 142.81 LBS | SYSTOLIC BLOOD PRESSURE: 128 MMHG

## 2024-08-01 DIAGNOSIS — Z12.4 SCREENING FOR MALIGNANT NEOPLASM OF THE CERVIX: ICD-10-CM

## 2024-08-01 DIAGNOSIS — A49.3 INFECTION DUE TO MYCOPLASMA GENITALIUM: ICD-10-CM

## 2024-08-01 DIAGNOSIS — Z01.419 WELL WOMAN EXAM WITH ROUTINE GYNECOLOGICAL EXAM: Primary | ICD-10-CM

## 2024-08-01 DIAGNOSIS — Z97.5 IUD (INTRAUTERINE DEVICE) IN PLACE: ICD-10-CM

## 2024-08-01 PROCEDURE — 87661 TRICHOMONAS VAGINALIS AMPLIF: CPT

## 2024-08-01 PROCEDURE — 87491 CHLMYD TRACH DNA AMP PROBE: CPT

## 2024-08-01 PROCEDURE — 87591 N.GONORRHOEAE DNA AMP PROB: CPT

## 2024-08-01 RX ORDER — FLUCONAZOLE 200 MG/1
200 TABLET ORAL EVERY OTHER DAY
Qty: 4 TABLET | Refills: 0 | Status: SHIPPED | OUTPATIENT
Start: 2024-08-01 | End: 2024-08-08

## 2024-08-01 NOTE — TELEPHONE ENCOUNTER
----- Message from Ramandeep Flores sent at 2024  1:26 PM CDT -----  Regarding: Patient case    Who Called:  patient  Best Call Back Number:  [phone]  Additional Information:  called in regards to partner not having meds sent to [facility] in [place] to be treated as well (name- Kalin Urbinamarielyjarvis ZEENAT 1994, no known allergies); she picked up her meds

## 2024-08-01 NOTE — TELEPHONE ENCOUNTER
Placed script on VM box at pharmacy. Doxy BID X7 days then followed by azithromycin 1g for initial dose then 500 mg daily X3 days after.

## 2024-08-01 NOTE — PROGRESS NOTES
Chief Complaint:   Well Woman (Annual gyn exam)     History of Present Illness:  Emily Avilez is a 24 y.o. year old  presents for her well woman exam. Currently relying on paraguard IUD for birth control. Having regular monthly cycles lasting 7 days with heavy bleeding. Denies any irregular menstrual bleeding. Pt is currently being treated for MG. Desires to also treat partner.      Gyn History:  Menstrual History  Cycle: Yes (LMP:24)  Menarche Age: 12 years  Flow Duration: 7  Flow: (!) Heavy  Interval: 28  Intermenstrual Bleeding: No  Dysmenorrhea: No    Menopause  Menopause Age: 1 years    Pap History  Last pap date: 23  Result: Normal  History of Abnormal Pap: No  HPV Vaccine Completed: No    [place]  Sexually Active: Yes  Sexual Orientation: heterosexual  Postcoital Bleeding: No  Dyspareunia: No  STI History: No  Contraception: Yes (Paraguard inserted 23)  Contraception Type: IUD    Breast History  Last Breast Imaging Date: No  History of Breast Biopsy: No        Review of Systems:  General/Constitutional: Chills denies. Fatigue/weakness denies. Fever denies. Night sweats denies. Hot flashes denies    Respiratory: Cough denies. Hemoptysis denies. SOB denies. Sputum production denies. Wheezing denies .   Cardiovascular: Chest pain denies. Dizziness denies. Palpitations denies. Swelling in hands/feet denies.                Breast: Dimpling denies. Breast mass denies. Breast pain/tenderness denies. Nipple discharge denies. Puckering denies.  Gastrointestinal: Abdominal pain denies. Blood in stool denies. Constipation denies. Diarrhea denies. Heartburn denies. Nausea denies. Vomiting denies    Genitourinary: Incontinence denies. Blood in urine denies. Frequent urination denies. Painful urination denies. Urinary urgency denies. Nocturia denies    Gynecologic: Irregular menses denies. Heavy bleeding denies. Painful menses denies. Vaginal discharge denies. Vaginal odor denies. Vaginal  itching denies. Vaginal lesion denies. Pelvic pain denies. Decreased libido denies. Vulvar lesion denies. Prolapse of genital organs denies. Painful intercourse denies. Postcoital bleeding denies    Psychiatric: Depression denies. Anxiety denies.     OB History    Para Term  AB Living   1 1 1 0 0 1   SAB IAB Ectopic Multiple Live Births   0 0 0 0 1      # 1 - Date: , Sex: Female, Weight: 3.629 kg (8 lb), GA: None, Type: Vaginal, Spontaneous, Apgar1: None, Apgar5: None, Living: Living, Birth Comments: None      Past Medical History:   Diagnosis Date    Other lichen planus        Current Outpatient Medications:     copper intrauterine device (PARAGARD) 380 square mm IUD, 1 Intra Uterine Device by Intrauterine route once., Disp: , Rfl:     azithromycin (ZITHROMAX) 500 MG tablet, Take 2 tablet PO x1 dose and then take 1 tablet po once daily X3 days. (Patient not taking: Reported on 2024), Disp: 3 tablet, Rfl: 0    doxycycline (VIBRAMYCIN) 100 MG Cap, Take 1 capsule (100 mg total) by mouth 2 (two) times daily. for 7 days (Patient not taking: Reported on 2024), Disp: 14 capsule, Rfl: 0    fluconazole (DIFLUCAN) 200 MG Tab, Take 1 tablet (200 mg total) by mouth every other day. for 4 doses, Disp: 4 tablet, Rfl: 0    Review of patient's allergies indicates:  No Known Allergies    Past Surgical History:   Procedure Laterality Date    INTRAUTERINE DEVICE INSERTION  2023    Paragard by Dr KAREN Riggs    KNEE ARTHROSCOPY W/ ACL RECONSTRUCTION Right 2019    TONSILLECTOMY Bilateral      Family History   Problem Relation Name Age of Onset    Breast cancer Neg Hx      Ovarian cancer Neg Hx      Uterine cancer Neg Hx      Cervical cancer Neg Hx       Social History     Socioeconomic History    Marital status: Single   Tobacco Use    Smoking status: Never    Smokeless tobacco: Never   Substance and Sexual Activity    Alcohol use: Not Currently    Drug use: Never    Sexual activity: Yes     Partners:  "Male     Birth control/protection: I.U.D.     Social Determinants of Health     Financial Resource Strain: Low Risk  (1/22/2021)    [facility], [facility]    Overall Financial Resource Strain (CARDIA)     Difficulty of Paying Living Expenses: Not hard at all   Food Insecurity: No Food Insecurity (1/22/2021)    [facility]    Hunger Vital Sign     Worried About Running Out of Food in the Last Year: Never true     Ran Out of Food in the Last Year: Never true   Transportation Needs: No Transportation Needs (1/22/2021)    [facility]    PRAPARE - Transportation     Lack of Transportation (Medical): No     Lack of Transportation (Non-Medical): No   Physical Activity: Unknown (1/22/2021)    [facility]    Exercise Vital Sign     Days of Exercise per Week: Patient declined     Minutes of Exercise per Session: Patient declined       Physical Exam:  /70 (BP Location: Right arm, Patient Position: Sitting, BP Method: Medium (Manual))   Ht 5' 5" (1.651 m)   Wt 64.8 kg (142 lb 12.8 oz)   LMP 07/18/2024 (Exact Date)   BMI 23.76 kg/m²     Chaperone: present.       General appearance: healthy, well-nourished and well-developed     Psychiatric: Orientation to time, place and person. Normal mood and affect and active, alert     Skin:   Appearance: " no rashes or lesions.     Neck:   Neck: supple, FROM, trachea midline. and no masses   Thyroid: no enlargement or nodules and non-tender.       Cardiovascular:   Auscultation: RRR and no murmur.   Peripheral Vascular: no varicosities, LLE edema, RLE edema, calf tenderness, and palpable cord and pedal pulses intact.     Lungs:   Respiratory effort: no intercostal retractions or accessory muscle usage.   Auscultation: no wheezing, rales/crackles, or rhonchi and clear to auscultation.     Breast:   Inspection/Palpation: no tenderness, discrete/distinct masses, skin changes, or abnormal secretions. Nipple appearance normal.     Abdomen:   Auscultation/Inspection/Palpation: no hepatomegaly, splenomegaly, masses, tenderness or CVA tenderness and soft, non-distended bowel sounds preset.    Hernia: no palpable hernias.     Female Genitalia:   Vulva: no masses, tenderness or lesions   Bladder/Urethra: no urethral discharge or mass, normal meatus, bladder non-distended.   Vagina: no tenderness, erythema, cystocele, rectocele, abnormal vaginal discharge or vesicle(s) or ulcers   Cervix: no discharge, no cervical lacerations noted or motion tenderness and grossly normal, IUD strings noted to external os  Uterus: normal size and shape and midline, non-tender, and no uterine prolapse.   Adnexa/Parametria: no parametrial tenderness or mass, no adnexal tenderness or ovarian mass.     Lymph Nodes:   Palpation: non tender submandibular nodes, axillary nodes, or inguinal nodes.     Rectal Exam:   Rectum: normal perianal skin.        Assessment/Plan:  1. Well woman exam with routine gynecological exam  Pap  Recommend BSE monthly  Discussed recommendations of annual screening after age of 40 with mammogram    Explained that screening is not 100% reliable. Advised patient if she notices any changes to her breast including a lump, mass, dimpling, discharge, rash, or tenderness she should contact us immediately.     Healthy diet, exercise    Multivitamin   Seat belt   Sunscreen use   Safe sex/ STI education   Contraception evaluation: Paraguard   Gardasil evaluation: 0/3    2. IUD (intrauterine device) in place    3. 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 Screening for malignant neoplasm of the cervix  -     Liquid-Based Pap Smear, Screening    Other orders  -     fluconazole (DIFLUCAN) 200 MG Tab; Take 1 tablet (200 mg total) by mouth every other day. for 4 doses  Dispense: 4 tablet; Refill: 0  -for prevention of yeast while on antibiotics.        RTC in 5 weeks for GEMA for MG or PRN.

## 2024-08-05 ENCOUNTER — TELEPHONE (OUTPATIENT)
Dept: OBSTETRICS AND GYNECOLOGY | Facility: CLINIC | Age: 24
End: 2024-08-05
Payer: MEDICAID

## 2024-09-03 ENCOUNTER — LAB VISIT (OUTPATIENT)
Dept: LAB | Facility: HOSPITAL | Age: 24
End: 2024-09-03
Payer: MEDICAID

## 2024-09-03 ENCOUNTER — OFFICE VISIT (OUTPATIENT)
Dept: OBSTETRICS AND GYNECOLOGY | Facility: CLINIC | Age: 24
End: 2024-09-03
Payer: MEDICAID

## 2024-09-03 VITALS
SYSTOLIC BLOOD PRESSURE: 104 MMHG | WEIGHT: 143.38 LBS | BODY MASS INDEX: 23.86 KG/M2 | TEMPERATURE: 96 F | DIASTOLIC BLOOD PRESSURE: 60 MMHG

## 2024-09-03 DIAGNOSIS — Z84.2 FAMILY HISTORY OF PCOS: ICD-10-CM

## 2024-09-03 DIAGNOSIS — A49.3 INFECTION DUE TO MYCOPLASMA GENITALIUM: Primary | ICD-10-CM

## 2024-09-03 LAB
25(OH)D3+25(OH)D2 SERPL-MCNC: 69 NG/ML (ref 30–80)
ANION GAP SERPL CALC-SCNC: 5 MEQ/L
BASOPHILS # BLD AUTO: 0.02 X10(3)/MCL
BASOPHILS NFR BLD AUTO: 0.3 %
BUN SERPL-MCNC: 9 MG/DL (ref 7–18.7)
CALCIUM SERPL-MCNC: 9.2 MG/DL (ref 8.4–10.2)
CHLORIDE SERPL-SCNC: 111 MMOL/L (ref 98–107)
CO2 SERPL-SCNC: 25 MMOL/L (ref 22–29)
CREAT SERPL-MCNC: 0.8 MG/DL (ref 0.55–1.02)
CREAT/UREA NIT SERPL: 11
EOSINOPHIL # BLD AUTO: 0.08 X10(3)/MCL (ref 0–0.9)
EOSINOPHIL NFR BLD AUTO: 1.3 %
ERYTHROCYTE [DISTWIDTH] IN BLOOD BY AUTOMATED COUNT: 13 % (ref 11.5–17)
ESTRADIOL SERPL HS-MCNC: 33 PG/ML
FSH SERPL-ACNC: 5.17 MIU/ML
GFR SERPLBLD CREATININE-BSD FMLA CKD-EPI: >60 ML/MIN/1.73/M2
GLUCOSE SERPL-MCNC: 100 MG/DL (ref 74–100)
HCT VFR BLD AUTO: 38.6 % (ref 37–47)
HGB BLD-MCNC: 13.1 G/DL (ref 12–16)
IMM GRANULOCYTES # BLD AUTO: 0.01 X10(3)/MCL (ref 0–0.04)
IMM GRANULOCYTES NFR BLD AUTO: 0.2 %
LYMPHOCYTES # BLD AUTO: 1.24 X10(3)/MCL (ref 0.6–4.6)
LYMPHOCYTES NFR BLD AUTO: 20.1 %
MCH RBC QN AUTO: 29.9 PG (ref 27–31)
MCHC RBC AUTO-ENTMCNC: 33.9 G/DL (ref 33–36)
MCV RBC AUTO: 88.1 FL (ref 80–94)
MONOCYTES # BLD AUTO: 0.56 X10(3)/MCL (ref 0.1–1.3)
MONOCYTES NFR BLD AUTO: 9.1 %
NEUTROPHILS # BLD AUTO: 4.25 X10(3)/MCL (ref 2.1–9.2)
NEUTROPHILS NFR BLD AUTO: 69 %
PLATELET # BLD AUTO: 189 X10(3)/MCL (ref 130–400)
PMV BLD AUTO: 12.1 FL (ref 7.4–10.4)
POTASSIUM SERPL-SCNC: 3.8 MMOL/L (ref 3.5–5.1)
PROLACTIN LEVEL (OLG): 7.85 NG/ML (ref 5.18–26.53)
RBC # BLD AUTO: 4.38 X10(6)/MCL (ref 4.2–5.4)
SODIUM SERPL-SCNC: 141 MMOL/L (ref 136–145)
T4 FREE SERPL-MCNC: 0.98 NG/DL (ref 0.7–1.48)
TESTOST SERPL-MCNC: 43.34 NG/DL (ref 13.84–53.35)
TSH SERPL-ACNC: 1.2 UIU/ML (ref 0.35–4.94)
WBC # BLD AUTO: 6.16 X10(3)/MCL (ref 4.5–11.5)

## 2024-09-03 PROCEDURE — 82670 ASSAY OF TOTAL ESTRADIOL: CPT

## 2024-09-03 PROCEDURE — 84403 ASSAY OF TOTAL TESTOSTERONE: CPT

## 2024-09-03 PROCEDURE — 1159F MED LIST DOCD IN RCRD: CPT | Mod: CPTII,,,

## 2024-09-03 PROCEDURE — 36415 COLL VENOUS BLD VENIPUNCTURE: CPT

## 2024-09-03 PROCEDURE — 84146 ASSAY OF PROLACTIN: CPT

## 2024-09-03 PROCEDURE — 82627 DEHYDROEPIANDROSTERONE: CPT

## 2024-09-03 PROCEDURE — 83001 ASSAY OF GONADOTROPIN (FSH): CPT

## 2024-09-03 PROCEDURE — 84443 ASSAY THYROID STIM HORMONE: CPT

## 2024-09-03 PROCEDURE — 85025 COMPLETE CBC W/AUTO DIFF WBC: CPT

## 2024-09-03 PROCEDURE — 3074F SYST BP LT 130 MM HG: CPT | Mod: CPTII,,,

## 2024-09-03 PROCEDURE — 99213 OFFICE O/P EST LOW 20 MIN: CPT | Mod: ,,,

## 2024-09-03 PROCEDURE — 80048 BASIC METABOLIC PNL TOTAL CA: CPT

## 2024-09-03 PROCEDURE — 84439 ASSAY OF FREE THYROXINE: CPT

## 2024-09-03 PROCEDURE — 1160F RVW MEDS BY RX/DR IN RCRD: CPT | Mod: CPTII,,,

## 2024-09-03 PROCEDURE — 82306 VITAMIN D 25 HYDROXY: CPT

## 2024-09-03 PROCEDURE — 3008F BODY MASS INDEX DOCD: CPT | Mod: CPTII,,,

## 2024-09-03 PROCEDURE — 3078F DIAST BP <80 MM HG: CPT | Mod: CPTII,,,

## 2024-09-03 NOTE — PROGRESS NOTES
Chief Complaint:  Test Of Cure (GEMA +MG Tx'd on 24)    History of Present Illness:  Emily Avilez is a 24 y.o.  who presents for a test of cure due to a previous diagnosis of Mycoplasma genitalium. She completed her antibiotics without problems. She denies vaginal itching, odor, or discharge. That partner was also treated and had completed the antibiotics.     Pt desires labs to check for PCOS d/t acne, hair thinning, hair growth to chin, and family history of PCOS.      Gyn History:  Menstrual History  Cycle: Yes (LMP:24)  Menarche Age: 12 years  Flow Duration: 4  Flow: (!) Heavy  Interval: 28  Intermenstrual Bleeding: No  Dysmenorrhea: Yes  Dysmenorrhea Severity : Moderate    Menopause  Menopause Age: 1 years    Pap History  Last pap date: 24  Result: Normal  History of Abnormal Pap: No  HPV Vaccine Completed: No    [place]  Sexually Active: Yes  Sexual Orientation: heterosexual  Postcoital Bleeding: No  Dyspareunia: No  STI History: No  Contraception: Yes (Paraguard inserted 23)  Contraception Type: IUD    Breast History  Last Breast Imaging Date: No  History of Breast Biopsy: No        Review of Systems:  General/Constitutional: Chills denies. Fatigue/weakness denies. Fever denies. Night sweats denies. Hot flashes denies  Gastrointestinal: Abdominal pain denies. Blood in stool denies. Constipation denies. Diarrhea denies. Heartburn denies. Nausea denies. Vomiting denies   Genitourinary: Incontinence denies. Blood in urine denies. Frequent urination denies. Urgency denies. Painful urination denies. Nocturia denies   Gynecologic: Irregular menses denies. Heavy bleeding denies. Painful menses denies. Vaginal discharge denies. Vaginal odor denies. Vaginal itching/Irritation denies. Vaginal lesion denies.  Pelvic pain denies. Decreased libido denies. Vulvar lesion denies. Prolapse of genital organs denies. Painful intercourse denies. Postcoital bleeding denies     OB History     Para Term  AB Living   1 1 1 0 0 1   SAB IAB Ectopic Multiple Live Births   0 0 0 0 1      # 1 - Date: , Sex: Female, Weight: 3.629 kg (8 lb), GA: None, Type: Vaginal, Spontaneous, Apgar1: None, Apgar5: None, Living: Living, Birth Comments: None      Past Medical History:   Diagnosis Date    Other lichen planus        Current Outpatient Medications:     copper intrauterine device (PARAGARD) 380 square mm IUD, 1 Intra Uterine Device by Intrauterine route once., Disp: , Rfl:     azithromycin (ZITHROMAX) 500 MG tablet, Take 2 tablet PO x1 dose and then take 1 tablet po once daily X3 days. (Patient not taking: Reported on 2024), Disp: 3 tablet, Rfl: 0      Physical Exam:  /60 (BP Location: Right arm, Patient Position: Sitting, BP Method: Medium (Manual))   Temp 96.1 °F (35.6 °C) (Temporal)   Wt 65 kg (143 lb 6.4 oz)   LMP 2024 (Exact Date)   BMI 23.86 kg/m²     Chaperone present.       Constitutional: General appearance: healthy, well-nourished and well-developed   Psychiatric: Orientation to time, place and person. Normal mood and affect and active, alert   Abdomen: Auscultation/Inspection/Palpation: No tenderness or masses. Soft, nondistended    Female Genitalia:      Vulva: no masses, atrophy or lesions      Bladder/Urethra: no urethral discharge or mass, normal meatus, bladder non-distended.      Vagina: no tenderness, erythema, cystocele, rectocele, abnormal vaginal discharge, or vesicle(s) or ulcers (Q-tip only)      Assessment/Plan:  1. 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 Family history of PCOS  -     Prolactin; Future; Expected date: 2024  -     T4, Free; Future; Expected date: 2024  -     TSH; Future; Expected date: 2024  -     Follicle Stimulating Hormone; Future; Expected date: 2024  -     Estradiol; Future; Expected date: 2024  -     DHEA-Sulfate; Future; Expected date: 2024  -     Testosterone; Future; Expected date:  09/03/2024  -     CBC Auto Differential; Future; Expected date: 09/03/2024  -     Basic Metabolic Panel; Future; Expected date: 09/03/2024  -     Vitamin D; Future; Expected date: 09/03/2024       Educated  Safe sex education  STI information     Pelvic rest      RTC pending all results or PRN

## 2024-09-04 LAB — DHEA-S SERPL-MCNC: 387 MCG/DL (ref 83–377)

## 2025-03-18 NOTE — PROGRESS NOTES
"Chief Complaint:  Procedure (Here for Paragard IUD removal. Pargard IUD inserted on 23.)    History of Present Illness:  Emily Avilez is a 24 y.o.  who presents today for Paragard IUD removal. Inserted 23. Desires removal due to partner with vasectomy.         Review of Systems:  General/Constitutional: Chills denies. Fatigue/weakness denies. Fever denies . Night sweats denies . Hot flashes denies  Gynecologic: Irregular menses denies.  Heavy bleeding  denies.  Painful menses denies.  Vaginal discharge denies. Vaginal odor denies. Vaginal itching/Irritation denies. Vaginal lesion denies.  Pelvic pain denies. Decreased libido denies. Vulvar lesion denies. Prolapse of genital organs denies. Painful intercourse denies. Postcoital bleeding denies   Psychiatric: Mood lability denies.  Depressed mood denies. Suicidal thoughts denies. Anxiety denies.  Overwhelmed denies.  Appetite normal. Energy level normal     OB History          1    Para   1    Term   1            AB        Living   1         SAB        IAB        Ectopic        Multiple        Live Births   1                 Past Medical History:   Diagnosis Date    Other lichen planus        Current Medications[1]    Review of patient's allergies indicates:  No Known Allergies    Past Surgical History:   Procedure Laterality Date    INTRAUTERINE DEVICE INSERTION  2023    Paragard by Dr KAREN Riggs    KNEE ARTHROSCOPY W/ ACL RECONSTRUCTION Right 2019    TONSILLECTOMY Bilateral        Family History   Problem Relation Name Age of Onset    Breast cancer Neg Hx      Ovarian cancer Neg Hx      Uterine cancer Neg Hx      Cervical cancer Neg Hx         Social History[2]    Physical Exam:  /60 (BP Location: Left arm, Patient Position: Sitting)   Pulse 74   Resp 16   Ht 5' 5" (1.651 m)   Wt 64.3 kg (141 lb 11.2 oz)   LMP 2025 (Exact Date)   BMI 23.58 kg/m²     Chaperone present.    Constitutional: General appearance: " healthy, well-nourished and well-developed  Psychiatric: Orientation to time, place and person. Normal mood and affect and active, alert  Abdomen: Auscultation/Inspection/Palpation: No tenderness or masses. Soft, nondistended   Female Genitalia:  Vulva: no masses, atrophy or lesions  Bladder/Urethra: no urethral discharge or mass, normal meatus, bladder   non-distended.  Vagina: no tenderness, erythema, cystocele, rectocele, abnormal  vaginal discharge, or vesicle(s) or ulcers   Cervix: no discharge or cervical motion tenderness and grossly normal.  IUD strings visible.  Uterus: normal size and shape and midline, non-tender, and no uterine   prolapse.  Adnexa/Parametria: no parametrial tenderness or mass, no adnexal tenderness  or ovarian mass.    IUD Removal:  Pelvic examination was normal. Pap smear is current. Urine pregnancy test negative.  With the patient in the dorsal lithotomy position, a speculum was placed into vagina.  The strings were easily grasped with: Ring Forceps.  The Mirena IUD was removed and shown to the patient.  Patient tolerated procedure well.      Assessment/Plan:  1. Encounter for IUD removal  -     POCT urine pregnancy           Consent given, IUD removed, tolerated well    RTC prn/annual       This note was transcribed by Maria G Monteiro. There may be transcription errors as a result, however minimal. Effort has been made to ensure accuracy of transcription, but any obvious errors or omissions should be clarified with the author of the document.       I agree with the above documentation.                  [1]   Current Outpatient Medications:     copper intrauterine device (PARAGARD) 380 square mm IUD, 1 Intra Uterine Device by Intrauterine route once., Disp: , Rfl:   [2]   Social History  Socioeconomic History    Marital status: Single   Tobacco Use    Smoking status: Never    Smokeless tobacco: Never   Substance and Sexual Activity    Alcohol use: Not Currently    Drug use: Never     Sexual activity: Yes     Partners: Male     Birth control/protection: I.U.D.     Social Drivers of Health     Financial Resource Strain: Low Risk  (1/22/2021)    [facility] [place] and Its Subsidiaries and Affiliates    Overall Financial Resource Strain (CARDIA)     Difficulty of Paying Living Expenses: Not hard at all   Food Insecurity: No Food Insecurity (1/22/2021)    [facility] Its Subsidiaries and Affiliates    Hunger Vital Sign     Worried About Running Out of Food in the Last Year: Never true     Ran Out of Food in the Last Year: Never true   Transportation Needs: No Transportation Needs (1/22/2021)    [facility] Its Subsidiaries and Affiliates    PRAPARE - Transportation     Lack of Transportation (Medical): No     Lack of Transportation (Non-Medical): No   Physical Activity: Unknown (1/22/2021)    Received [facility] [place] [facility] and Its Subsidiaries and Affiliates    Exercise Vital Sign     Days of Exercise per Week: Patient declined     Minutes of Exercise per Session: Patient declined

## 2025-03-25 ENCOUNTER — PROCEDURE VISIT (OUTPATIENT)
Dept: OBSTETRICS AND GYNECOLOGY | Facility: CLINIC | Age: 25
End: 2025-03-25
Payer: MEDICAID

## 2025-03-25 VITALS
BODY MASS INDEX: 23.61 KG/M2 | RESPIRATION RATE: 18 BRPM | SYSTOLIC BLOOD PRESSURE: 116 MMHG | DIASTOLIC BLOOD PRESSURE: 64 MMHG | WEIGHT: 141.69 LBS | HEIGHT: 65 IN | HEART RATE: 76 BPM

## 2025-03-25 DIAGNOSIS — Z30.432 ENCOUNTER FOR IUD REMOVAL: Primary | ICD-10-CM

## 2025-03-25 LAB
B-HCG UR QL: NEGATIVE
CTP QC/QA: YES

## 2025-03-25 PROCEDURE — 81025 URINE PREGNANCY TEST: CPT | Mod: ,,, | Performed by: OBSTETRICS & GYNECOLOGY

## 2025-03-25 PROCEDURE — 99499 UNLISTED E&M SERVICE: CPT | Mod: ,,, | Performed by: OBSTETRICS & GYNECOLOGY

## 2025-03-25 PROCEDURE — 58301 REMOVE INTRAUTERINE DEVICE: CPT | Mod: ,,, | Performed by: OBSTETRICS & GYNECOLOGY
